# Patient Record
Sex: FEMALE | Race: BLACK OR AFRICAN AMERICAN
[De-identification: names, ages, dates, MRNs, and addresses within clinical notes are randomized per-mention and may not be internally consistent; named-entity substitution may affect disease eponyms.]

---

## 2018-06-12 ENCOUNTER — HOSPITAL ENCOUNTER (EMERGENCY)
Dept: HOSPITAL 62 - ER | Age: 48
LOS: 1 days | Discharge: HOME | End: 2018-06-13
Payer: SELF-PAY

## 2018-06-12 DIAGNOSIS — I10: ICD-10-CM

## 2018-06-12 DIAGNOSIS — R42: Primary | ICD-10-CM

## 2018-06-12 DIAGNOSIS — Z90.710: ICD-10-CM

## 2018-06-12 DIAGNOSIS — M79.605: ICD-10-CM

## 2018-06-12 DIAGNOSIS — Z90.49: ICD-10-CM

## 2018-06-12 DIAGNOSIS — Z88.6: ICD-10-CM

## 2018-06-12 DIAGNOSIS — F17.210: ICD-10-CM

## 2018-06-12 LAB
ADD MANUAL DIFF: NO
ALBUMIN SERPL-MCNC: 4.2 G/DL (ref 3.5–5)
ALP SERPL-CCNC: 82 U/L (ref 38–126)
ALT SERPL-CCNC: 39 U/L (ref 9–52)
ANION GAP SERPL CALC-SCNC: 9 MMOL/L (ref 5–19)
APPEARANCE UR: CLEAR
APTT PPP: YELLOW S
AST SERPL-CCNC: 43 U/L (ref 14–36)
BASOPHILS # BLD AUTO: 0 10^3/UL (ref 0–0.2)
BASOPHILS NFR BLD AUTO: 0.6 % (ref 0–2)
BILIRUB DIRECT SERPL-MCNC: 0.2 MG/DL (ref 0–0.4)
BILIRUB SERPL-MCNC: 0.3 MG/DL (ref 0.2–1.3)
BILIRUB UR QL STRIP: NEGATIVE
BUN SERPL-MCNC: 11 MG/DL (ref 7–20)
CALCIUM: 9.4 MG/DL (ref 8.4–10.2)
CHLORIDE SERPL-SCNC: 108 MMOL/L (ref 98–107)
CO2 SERPL-SCNC: 27 MMOL/L (ref 22–30)
EOSINOPHIL # BLD AUTO: 0.2 10^3/UL (ref 0–0.6)
EOSINOPHIL NFR BLD AUTO: 2.8 % (ref 0–6)
ERYTHROCYTE [DISTWIDTH] IN BLOOD BY AUTOMATED COUNT: 14.2 % (ref 11.5–14)
GLUCOSE SERPL-MCNC: 86 MG/DL (ref 75–110)
GLUCOSE UR STRIP-MCNC: NEGATIVE MG/DL
HCT VFR BLD CALC: 35.2 % (ref 36–47)
HGB BLD-MCNC: 11.8 G/DL (ref 12–15.5)
KETONES UR STRIP-MCNC: NEGATIVE MG/DL
LYMPHOCYTES # BLD AUTO: 4.2 10^3/UL (ref 0.5–4.7)
LYMPHOCYTES NFR BLD AUTO: 56.1 % (ref 13–45)
MCH RBC QN AUTO: 31.4 PG (ref 27–33.4)
MCHC RBC AUTO-ENTMCNC: 33.6 G/DL (ref 32–36)
MCV RBC AUTO: 93 FL (ref 80–97)
MONOCYTES # BLD AUTO: 0.5 10^3/UL (ref 0.1–1.4)
MONOCYTES NFR BLD AUTO: 6.7 % (ref 3–13)
NEUTROPHILS # BLD AUTO: 2.5 10^3/UL (ref 1.7–8.2)
NEUTS SEG NFR BLD AUTO: 33.8 % (ref 42–78)
NITRITE UR QL STRIP: NEGATIVE
PH UR STRIP: 7 [PH] (ref 5–9)
PLATELET # BLD: 186 10^3/UL (ref 150–450)
POTASSIUM SERPL-SCNC: 3.8 MMOL/L (ref 3.6–5)
PROT SERPL-MCNC: 7.6 G/DL (ref 6.3–8.2)
PROT UR STRIP-MCNC: NEGATIVE MG/DL
RBC # BLD AUTO: 3.78 10^6/UL (ref 3.72–5.28)
SODIUM SERPL-SCNC: 143.6 MMOL/L (ref 137–145)
SP GR UR STRIP: 1.01
TOTAL CELLS COUNTED % (AUTO): 100 %
UROBILINOGEN UR-MCNC: NEGATIVE MG/DL (ref ?–2)
WBC # BLD AUTO: 7.5 10^3/UL (ref 4–10.5)

## 2018-06-12 PROCEDURE — 81001 URINALYSIS AUTO W/SCOPE: CPT

## 2018-06-12 PROCEDURE — 99284 EMERGENCY DEPT VISIT MOD MDM: CPT

## 2018-06-12 PROCEDURE — 80053 COMPREHEN METABOLIC PANEL: CPT

## 2018-06-12 PROCEDURE — 93005 ELECTROCARDIOGRAM TRACING: CPT

## 2018-06-12 PROCEDURE — 93971 EXTREMITY STUDY: CPT

## 2018-06-12 PROCEDURE — 85025 COMPLETE CBC W/AUTO DIFF WBC: CPT

## 2018-06-12 PROCEDURE — 93010 ELECTROCARDIOGRAM REPORT: CPT

## 2018-06-12 PROCEDURE — 36415 COLL VENOUS BLD VENIPUNCTURE: CPT

## 2018-06-12 NOTE — ER DOCUMENT REPORT
ED Extremity Problem, Lower





- General


Chief Complaint: Leg Pain


Stated Complaint: LT LEG PAIN


Time Seen by Provider: 06/12/18 21:08


Notes: 





This is a 47-year-old female patient complaining of pain down the left leg as 

well as dizziness.  Patient states that she overdid herself graduation on 

Saturday.  Was running around and on her feet all day long.  Now having pain 

that shoots down her low back all the way down to her left lower extremity.  

Most of the pain is located behind the knee and in the calf.  Patient states 

that she had a family member that had a blood clot before but this was not a 

blood relative.  She is worried about blood clots and wanted to get checked 

out.  Denies any chest pain but does have some dizziness that comes and goes.  

Seems to be worse with head movement.  No fever, chills, sweats.  No other 

major problems at this time.


TRAVEL OUTSIDE OF THE [place] IN LAST 30 DAYS: No





- HPI


Patient complains to provider of: Pain


Location: Leg





- Related Data


Allergies/Adverse Reactions: 


 





acetaminophen [From Percocet] Adverse Reaction (Verified 06/12/18 19:37)


 


oxycodone [From Percocet] Adverse Reaction (Verified 06/12/18 19:37)


 











Past Medical History





- General


Information source: Patient





- Social History


Smoking Status: Current Every Day Smoker


Cigarette use (# per day): Yes


Smoking Education Provided: Yes


Frequency of alcohol use: None


Drug Abuse: None


Lives with: Spouse/Significant other


Family History: Hypertension





- Past Medical History


Cardiac Medical History: Reports: Hx Hypertension


Past Surgical History: Reports: Hx Cholecystectomy, Hx Hysterectomy, Hx Tubal 

Ligation





- Immunizations


Hx Diphtheria, Pertussis, Tetanus Vaccination: Yes





Review of Systems





- Review of Systems


Constitutional: No symptoms reported


EENT: See HPI, Vertigo.  denies: Eye pain, Eye discharge, Blurred vision, 

Tearing, Double vision, Ear pain


Cardiovascular: denies: Chest pain, Palpitations, Heart racing


Respiratory: No symptoms reported


Gastrointestinal: No symptoms reported


Genitourinary: No symptoms reported


Female Genitourinary: No symptoms reported


Musculoskeletal: See HPI, Muscle pain, Leg swelling


Skin: No symptoms reported


Hematologic/Lymphatic: No symptoms reported


Neurological/Psychological: No symptoms reported





Physical Exam





- Vital signs


Vitals: 


 











Temp Pulse Resp BP Pulse Ox


 


 98.7 F   78   20   151/89 H  99 


 


 06/12/18 19:53  06/12/18 19:53  06/12/18 19:53  06/12/18 19:53  06/12/18 19:53











Interpretation: Normal





- General


General appearance: Appears well, Alert





- HEENT


Head: Normocephalic, Atraumatic


Eyes: Normal


Pupils: PERRL





- Respiratory


Respiratory status: No respiratory distress


Chest status: Nontender


Breath sounds: Normal


Chest palpation: Normal





- Cardiovascular


Rhythm: Regular


Heart sounds: Normal auscultation


Murmur: No





- Abdominal


Inspection: Normal


Distension: No distension


Bowel sounds: Normal


Tenderness: Nontender


Organomegaly: No organomegaly





- Back


Back: Normal, Nontender





- Extremities


General upper extremity: Normal inspection, Nontender, Normal color, Normal ROM

, Normal temperature


General lower extremity: Normal inspection, Nontender, Normal color, Normal ROM

, Normal temperature, Normal weight bearing.  No: Kriss's sign





- Neurological


Neuro grossly intact: Yes


Cognition: Normal


Orientation: AAOx4


Sabrina Coma Scale Eye Opening: Spontaneous


Sabrina Coma Scale Verbal: Oriented


Sabrina Coma Scale Motor: Obeys Commands


Sabrina Coma Scale Total: 15


Speech: Normal


Motor strength normal: LUE, RUE, LLE, RLE


Additional motor exam normals: Equal 


Babinski reflex: Normal (flexor plantar)


Sensory: Normal


Notes: 





Vision has reproducible dizziness with Glenwood-Hallpike maneuver with head turned 

to the left.  No obvious nystagmus.





- Psychological


Associated symptoms: Normal affect, Normal mood





- Skin


Skin Temperature: Warm


Skin Moisture: Dry


Skin Color: Normal





Course





- Re-evaluation


Re-evalutation: 





06/12/18 21:26


Unlikely this represents a blood clot as it seems to be coming from her low 

back area and more consistent with a muscle strain.  Is tender to palpation in 

the hamstrings and in the calf muscle.  Will do ultrasound to rule out DVT, 

will get some basic labs based on her dizziness.  EKG, CBC chemistry ordered.


06/12/18 21:58





06/12/18 23:04





Laboratory











  06/12/18 06/12/18 06/12/18





  21:58 21:58 22:20


 


WBC   7.5 


 


RBC   3.78 


 


Hgb   11.8 L 


 


Hct   35.2 L 


 


MCV   93 


 


MCH   31.4 


 


MCHC   33.6 


 


RDW   14.2 H 


 


Plt Count   186 


 


Seg Neutrophils %   33.8 L 


 


Lymphocytes %   56.1 H 


 


Monocytes %   6.7 


 


Eosinophils %   2.8 


 


Basophils %   0.6 


 


Absolute Neutrophils   2.5 


 


Absolute Lymphocytes   4.2 


 


Absolute Monocytes   0.5 


 


Absolute Eosinophils   0.2 


 


Absolute Basophils   0.0 


 


Sodium  143.6  


 


Potassium  3.8  


 


Chloride  108 H  


 


Carbon Dioxide  27  


 


Anion Gap  9  


 


BUN  11  


 


Creatinine  0.82  


 


Est GFR ( Amer)  > 60  


 


Est GFR (Non-Af Amer)  > 60  


 


Glucose  86  


 


Calcium  9.4  


 


Total Bilirubin  0.3  


 


Direct Bilirubin  0.2  


 


Neonat Total Bilirubin  Not Reportable  


 


Neonat Direct Bilirubin  Not Reportable  


 


Neonat Indirect Bili  Not Reportable  


 


AST  43 H  


 


ALT  39  


 


Alkaline Phosphatase  82  


 


Total Protein  7.6  


 


Albumin  4.2  


 


Urine Color    YELLOW


 


Urine Appearance    CLEAR


 


Urine pH    7.0


 


Ur Specific Gravity    1.012


 


Urine Protein    NEGATIVE


 


Urine Glucose (UA)    NEGATIVE


 


Urine Ketones    NEGATIVE


 


Urine Blood    MODERATE H


 


Urine Nitrite    NEGATIVE


 


Urine Bilirubin    NEGATIVE


 


Urine Urobilinogen    NEGATIVE


 


Ur Leukocyte Esterase    NEGATIVE


 


Urine WBC (Auto)    1


 


Urine RBC (Auto)    1


 


Squamous Epi Cells Auto    5


 


Urine Mucus (Auto)    RARE


 


Urine Ascorbic Acid    NEGATIVE








At this time I believe that the labs are fairly unremarkable.  EKG 

unremarkable.  Feels better.  Ultrasound negative.  Will recommend close 

outpatient follow-up.  Return for any worsening symptoms or concerns.  Patient 

states that her pain gets better on ibuprofen so we will write her prescription 

for some prescription strength ibuprofen.





- Vital Signs


Vital signs: 


 











Temp Pulse Resp BP Pulse Ox


 


 98.7 F   78   20   151/89 H  99 


 


 06/12/18 19:53  06/12/18 19:53  06/12/18 19:53  06/12/18 19:53  06/12/18 19:53














- Laboratory


Result Diagrams: 


 06/12/18 21:58





 06/12/18 21:58


Laboratory results interpreted by me: 


 











  06/12/18 06/12/18 06/12/18





  21:58 21:58 22:20


 


Hgb   11.8 L 


 


Hct   35.2 L 


 


RDW   14.2 H 


 


Seg Neutrophils %   33.8 L 


 


Lymphocytes %   56.1 H 


 


Chloride  108 H  


 


AST  43 H  


 


Urine Blood    MODERATE H














- EKG Interpretation by Me


EKG shows normal: Sinus rhythm, Axis, Intervals, QRS Complexes, ST-T Waves





Discharge





- Discharge


Clinical Impression: 


 Vertigo, Leg pain, left





Condition: Good


Disposition: HOME, SELF-CARE


Instructions:  Leg Pain Nonspecific ([facility]), Vertigo ([facility])


Additional Instructions: 


Continue with ibuprofen 3 times a day.  Meclizine as needed for the dizziness.  

Follow-up with your regular doctor.  If symptoms are getting worse or you begin 

to have any worsening symptoms or concerns please return for repeat evaluation 

if unable to be seen by your primary care doctor.


Prescriptions: 


Ibuprofen [Motrin 800 mg Tablet] 800 mg PO Q8H PRN 10 Days #30 tab


 PRN Reason: For Pain Scale 3-4


Meclizine HCl 25 mg PO BID PRN 10 Days #20 tablet


 PRN Reason: Dizziness


Forms:  Return to Work


Referrals: 


LETICIA KHAN MD [[facility]] - Follow up as needed

## 2018-06-13 VITALS — SYSTOLIC BLOOD PRESSURE: 140 MMHG | DIASTOLIC BLOOD PRESSURE: 84 MMHG

## 2018-06-13 NOTE — EKG REPORT
SEVERITY:- BORDERLINE ECG -

SINUS RHYTHM

BORDERLINE LEFT AXIS DEVIATION

BORDERLINE T ABNORMALITIES, INFERIOR LEADS

:

Confirmed by: John Nieves MD 13-Jun-2018 07:16:05

## 2018-06-13 NOTE — XCELERA REPORT
[address] [address]

                             Tel: [phone]

                             Fax: [phone]



                     Lower Extremity Venous Evaluation

____________________________________________________________________________



Name: SOULEYMANE JORDAN

MRN: [id number]                Age: 47 yrs

Gender: Female                 : 1970

Patient Status: Preadmit       Patient Location: ER

Account #: [id number]

Study Date: 2018 09:26 PM

Accession #: [id number]

____________________________________________________________________________



Procedure: Color flow and duplex imaging of the veins of the left lower

extremity as well as the right Common Femoral vein.

Reason For Study: Left leg pain, possible DVT





Ordering Physician: NORM MIDDLETON

Performed By: Carmen eJnkins

____________________________________________________________________________



____________________________________________________________________________





Right Sided Venous Evaluation

The right common femoral vein is fully compressible. Spontaneous and phasic

flow is present in the right common femoral vein.



Left Sided Venous Evaluation

Posterior Tibial vein hard to see.



Normal vessel filling wall to wall, compression and augmentation as well as

Colour flow down to the infrageniculate veins.

____________________________________________________________________________





Interpretation Summary

No duplex evidence of DVT or obstruction in the left lower extremity nor in

the right Common Femoral vein.

____________________________________________________________________________



____________________________________________________________________________



Electronically signed by:      Lennox Williams      on 2018 08:04 AM



CC: NORM MIDDLETON

>

Williams, Lennox

## 2019-02-13 NOTE — ER DOCUMENT REPORT
ED General





- General


Chief Complaint: Chest Pain


Stated Complaint: CHEST PAIN


Time Seen by Provider: 02/13/19 12:35


Primary Care Provider: 


SARA JORGENSEN MD [ACTIVE STAFF] - Follow up in 3-5 days (cardiology, call 

for stress test set up.)


SHAN ANDERSEN DO [Primary Care Provider] - Follow up as needed


Notes: 





Patient is a 48-year-old female that presents to the emergency department for 

chief complaint of chest pain, and tingling in her face.  Patient states the 

symptoms started earlier today, they have been coming and going, which she feels

a tightness in her chest, and tingling along her right face, she states that it 

comes and goes.  She is had some pain in her neck on the right side as well, 

with mild shortness of breath associated with this.  It does not appear to be 

worse with exertion or better with rest.  She denies noting any recent cough, 

fevers, chills, night sweats, nausea, vomiting or diaphoresis.  Reports family 

history of coronary disease in her mother, denies personal history of diabetes, 

hyperlipidemia or heart disease.  She does admit to having hypertension, was 

just recently started on a low-dose hydrochlorothiazide pill.  Former smoker.





Past Medical History: Hypertension, GERD


Past Surgical History: Cholecystectomy, partial hysterectomy


Social History: Former smoker, admits occasional alcohol use, denies illicit 

drug use.


Family History: Reviewed and noncontributory for presenting illness


Allergies: Reviewed, see documented allergy list. 





REVIEW OF SYSTEMS:


Other than noted above, the 12 point review of systems was reviewed with the 

patient and were negative, all pertinent findings are included in the HPI.





PHYSICAL EXAMINATION:





Vital signs reviewed, nursing noted reviewed. 





GENERAL: Obese female, in no acute distress.





HEAD: Atraumatic, normocephalic.





EYES: Eyes appear normal, extraocular movements intact, sclera anicteric, 

conjunctiva are normal.





ENT: nares patent, oropharynx clear without exudates.  Moist mucous membranes.





NECK: Normal range of motion, supple without lymphadenopathy, no neck tenderness

to palpation, no midline tenderness or deformity.





LUNGS: Breath sounds clear to auscultation bilaterally and equal.  No wheezes 

rales or rhonchi.





HEART: Regular rate and rhythm without murmurs





ABDOMEN: Soft, nontender, normoactive bowel sounds.  No rebound, guarding, or 

rigidity. No masses appreciated.





EXTREMITIES: Nontender, good range of motion, no pitting or edema.  





NEUROLOGICAL: No focal neurological deficits. Moves all extremities s

pontaneously Motor and sensory grossly intact on exam.





PSYCH: Normal mood, normal affect.





SKIN: Warm, Dry, normal turgor, no rashes or lesions noted on exposed skin





TRAVEL OUTSIDE OF THE [place] IN LAST 30 DAYS: No





- Related Data


Allergies/Adverse Reactions: 


                                        





acetaminophen [From Percocet] Adverse Reaction (Verified 02/13/19 12:19)


   


oxycodone [From Percocet] Adverse Reaction (Verified 02/13/19 12:19)


   











Past Medical History





- Social History


Smoking Status: Current Some Day Smoker


Chew tobacco use (# tins/day): No


Frequency of alcohol use: Occasional


Drug Abuse: None


Family History: Hypertension


Patient has suicidal ideation: No


Patient has homicidal ideation: No





- Past Medical History


Cardiac Medical History: Reports: Hx Hypertension


Renal/ Medical History: Denies: Hx Peritoneal Dialysis


Past Surgical History: Reports: Hx Cholecystectomy, Hx Hysterectomy, Hx Tubal 

Ligation





- Immunizations


Hx Diphtheria, Pertussis, Tetanus Vaccination: Yes





Physical Exam





- Vital signs


Vitals: 


                                        











Temp Pulse Resp BP Pulse Ox


 


 98.6 F   89   16   178/125 H  98 


 


 02/13/19 12:21  02/13/19 12:21  02/13/19 12:21  02/13/19 12:21  02/13/19 12:21














Course





- Re-evaluation


Re-evalutation: 





Patient seen and examined vital signs reviewed. 





Laboratory data and imaging were ordered as appropriate for the patient's 

presenting symptoms and complaint, with consideration of any critical or life 

threatening conditions that may be associated with their obtained history and 

exam as noted above.





Patient was treated with aspirin and labetalol 10 mg IV ordered by triage 

provider.





Results were reviewed when available and demonstrated negative and unremarkable 

blood work, troponin negative x2





The patient was re-evaluated and was stable





Evaluation was most consistent with chest pain, facial paresthesias





Results were discussed with the patient at this point, after careful 

consideration I feel that that patient can be discharged from the emergency 

department, the patient was educated treatments and reasons to return to the 

emergency department based on their presumed diagnosis as noted above, they were

advised to followup with a primary care physician in 2-3 days. Patient was 

agreeable to plan of care.





Presentation of chest pain in an otherwise well appearing patient. Low clinical 

suspicion for ACS given clinical history, exam, EKG without ST elevations or 

depressions, and negative initial troponin. HEART score less than or equal to 3.

PE also seems unlikely given clinical history, absence of tachycardia or 

dyspnea. Patient is PERC criteria negative. CXR without evidence of pneumothorax

or pneumonia. No widened mediastinum. Aortic dissection also seems unlikely 

given history, symmetric pulses, CXR, and vitals. 





HEART Score:





History 0      


ECG 0      


Age 1      


Risk Factors 2   


Troponin 0   





Total: 3





Chest pain in a patient without evidence of cardiac or other serious etiology on

workup today. I discussed with patient that, based on their age, risk factors 

and emergency department testing today, the likelihood that their symptoms are 

related to a heart attack is very low (estimated risk of heart attack or death 

over the next 30 days of less than 1%).  The patient demonstrates decision 

making capacity and has verbalized an understanding of these risks to me. Based 

on this,  the patient has chosen to follow-up as an outpatient. Usual chest pain

return precautions reviewed. The patient states understanding and agreement with

this plan.








*Note is created using voice recognition software and may contain spelling, 

syntax or grammatical errors.











Laboratory











  02/13/19 02/13/19 02/13/19





  13:20 13:20 13:20


 


WBC  9.1  


 


RBC  4.26  


 


Hgb  13.6  


 


Hct  39.3  


 


MCV  92  


 


MCH  31.8  


 


MCHC  34.5  


 


RDW  14.0  


 


Plt Count  202  


 


Seg Neutrophils %  73.2  


 


Lymphocytes %  21.8  


 


Monocytes %  4.5  


 


Eosinophils %  0.0  


 


Basophils %  0.5  


 


Absolute Neutrophils  6.7  


 


Absolute Lymphocytes  2.0  


 


Absolute Monocytes  0.4  


 


Absolute Eosinophils  0.0  


 


Absolute Basophils  0.0  


 


Sodium   139.5 


 


Potassium   4.5 


 


Chloride   99 


 


Carbon Dioxide   27 


 


Anion Gap   14 


 


BUN   15 


 


Creatinine   0.91 


 


Est GFR ( Amer)   > 60 


 


Est GFR (Non-Af Amer)   > 60 


 


Glucose   161 H 


 


Calcium   9.8 


 


Total Bilirubin   0.5 


 


Direct Bilirubin   0.3 


 


Neonat Total Bilirubin   Not Reportable 


 


Neonat Direct Bilirubin   Not Reportable 


 


Neonat Indirect Bili   Not Reportable 


 


AST   50 H 


 


ALT   86 H 


 


Alkaline Phosphatase   111 


 


Creatine Kinase   28 L 


 


CK-MB (CK-2)    0.27


 


Troponin I    < 0.012


 


Total Protein   8.2 


 


Albumin   4.8 














  02/13/19





  16:26


 


WBC 


 


RBC 


 


Hgb 


 


Hct 


 


MCV 


 


MCH 


 


MCHC 


 


RDW 


 


Plt Count 


 


Seg Neutrophils % 


 


Lymphocytes % 


 


Monocytes % 


 


Eosinophils % 


 


Basophils % 


 


Absolute Neutrophils 


 


Absolute Lymphocytes 


 


Absolute Monocytes 


 


Absolute Eosinophils 


 


Absolute Basophils 


 


Sodium 


 


Potassium 


 


Chloride 


 


Carbon Dioxide 


 


Anion Gap 


 


BUN 


 


Creatinine 


 


Est GFR ( Amer) 


 


Est GFR (Non-Af Amer) 


 


Glucose 


 


Calcium 


 


Total Bilirubin 


 


Direct Bilirubin 


 


Neonat Total Bilirubin 


 


Neonat Direct Bilirubin 


 


Neonat Indirect Bili 


 


AST 


 


ALT 


 


Alkaline Phosphatase 


 


Creatine Kinase 


 


CK-MB (CK-2) 


 


Troponin I  < 0.012


 


Total Protein 


 


Albumin 











                                        





Chest X-Ray  02/13/19 12:39


IMPRESSION:  NO ACUTE RADIOGRAPHIC FINDING IN THE CHEST.


 








Head CT  02/13/19 12:39


IMPRESSION:  No evidence of acute intracranial process.


Enlarged partially empty sella turcica, stable.


EVIDENCE OF ACUTE STROKE: NO.


 














- Vital Signs


Vital signs: 


                                        











Temp Pulse Resp BP Pulse Ox


 


 98.6 F   89   20   125/91 H  98 


 


 02/13/19 12:21  02/13/19 12:21  02/13/19 18:01  02/13/19 18:01  02/13/19 18:01

















- Laboratory


Result Diagrams: 


                                 02/13/19 13:20





                                 02/13/19 13:20


Laboratory results interpreted by me: 


                                        











  02/13/19





  13:20


 


Glucose  161 H


 


AST  50 H


 


ALT  86 H


 


Creatine Kinase  28 L














- EKG Interpretation by Me


Additional EKG results interpreted by me: 





EKG demonstrates sinus rhythm with a ventricular rate of 86 bpm, left axis 

deviation, normal intervals, no evidence of acute ischemia on this EKG, no ST 

changes.  This is compared with the prior EKG from 6/12/2018, without 

significant change.








Discharge





- Discharge


Clinical Impression: 


 Facial paresthesia





Chest pain


Qualifiers:


 Chest pain type: unspecified Qualified Code(s): R07.9 - Chest pain, unspecified





Condition: Stable


Disposition: HOME, SELF-CARE


Instructions:  Chest Pain of Unclear Cause ([facility])


Additional Instructions: 


Please follow-up with your primary care physician regarding her blood pressure, 

and follow-up with cardiology to set up a stress test.  If you have worsening of

your symptoms or further concerns, you can always return to the emergency 

department to be reevaluated.


Referrals: 


SHAN ANDERSEN DO [Primary Care Provider] - Follow up as needed


SARA JORGENSEN MD [ACTIVE STAFF] - Follow up in 3-5 days (cardiology, call 

for stress test set up.)

## 2019-02-13 NOTE — RADIOLOGY REPORT (SQ)
EXAM DESCRIPTION:  CHEST SINGLE VIEW



COMPLETED DATE/TIME:  2/13/2019 2:17 pm



REASON FOR STUDY:  Chest pain



COMPARISON:  5/28/2013



EXAM PARAMETERS:  NUMBER OF VIEWS: One view.

TECHNIQUE: Single frontal radiographic view of the chest acquired.

RADIATION DOSE: NA

LIMITATIONS: None.



FINDINGS:  LUNGS AND PLEURA: No opacities, masses or pneumothorax. No pleural effusion.

MEDIASTINUM AND HILAR STRUCTURES: No masses.  Contour normal.

HEART AND VASCULAR STRUCTURES: Heart normal in size.  Normal vasculature.

BONES: No acute findings.

HARDWARE: None in the chest.

OTHER: No other significant finding.



IMPRESSION:  NO ACUTE RADIOGRAPHIC FINDING IN THE CHEST.



TECHNICAL DOCUMENTATION:  JOB ID:  [id number]

 2011 [employer]- All Rights Reserved



Reading location - IP/workstation name: [email or web address]

## 2019-02-13 NOTE — RADIOLOGY REPORT (SQ)
EXAM DESCRIPTION:  CT HEAD WITHOUT



COMPLETED DATE/TIME:  2/13/2019 2:12 pm



REASON FOR STUDY:  Headache and dizziness



COMPARISON:  11/10/10.



TECHNIQUE:  Axial images acquired through the brain without intravenous contrast.  Images reviewed wi
th bone, brain and subdural windows.  Additional sagittal and coronal reconstructions were generated.
 Images stored on PACS.

All CT scanners at this facility use dose modulation, iterative reconstruction, and/or weight based d
osing when appropriate to reduce radiation dose to as low as reasonably achievable (ALARA).

[facility]: Dose Right  [facility]: CareDose    [facility]: Dose Right    [facility]: Teradose 4D    [facility]: Smart [employer]



RADIATION DOSE:  CT Rad equipment meets quality standard of care and radiation dose reduction techniq
ues were employed. CTDIvol: 53.2 mGy. DLP: 1070 mGy-cm. mGy.



LIMITATIONS:  None.



FINDINGS:  VENTRICLES: Normal size and contour.

CEREBRUM: No masses.  No hemorrhage.  No midline shift.  No evidence for acute infarction. Normal gra
y/white matter differentiation. No areas of low density in the white matter.

CEREBELLUM: No masses.  No hemorrhage.  No alteration of density.  No evidence for acute infarction.

EXTRAAXIAL SPACES: No fluid collections.  No masses.

ORBITS AND GLOBE: No intra- or extraconal masses.  Normal contour of globe without masses.

CALVARIUM: No fracture.

PARANASAL SINUSES: Mild polypoid mucosal thickening within the right maxillary sinus.  Remaining para
nasal sinuses and mastoid air cells are clear.

SOFT TISSUES: No mass or hematoma.

OTHER: Enlarged partially empty sella turcica, stable.



IMPRESSION:  No evidence of acute intracranial process.

Enlarged partially empty sella turcica, stable.

EVIDENCE OF ACUTE STROKE: NO.



COMMENT:  Quality ID # 436: Final reports with documentation of one or more dose reduction techniques
 (e.g., Automated exposure control, adjustment of the mA and/or kV according to patient size, use of 
iterative reconstruction technique)



TECHNICAL DOCUMENTATION:  JOB ID:  [id number]

 2011 [employer]- All Rights Reserved



Reading location - IP/workstation name: [email or web address]

## 2019-02-13 NOTE — ER DOCUMENT REPORT
ED Medical Screen (RME)





- General


Chief Complaint: Chest Pain


Stated Complaint: CHEST PAIN


Time Seen by Provider: 02/13/19 12:35


Notes: 





48 years old female presents today with a 2-day history of elevated blood 

pressure chest pain palpitation, headache numbness tingling sensation over the 

right arm.  No weakness.  Also had neck pain but no neck stiffness.





On examination-appears anxious.  No nuchal rigidity, no focal weaknesses noted. 

Normal heart sounds








TRAVEL OUTSIDE OF THE [place] IN LAST 30 DAYS: No





- Related Data


Allergies/Adverse Reactions: 


                                        





acetaminophen [From Percocet] Adverse Reaction (Verified 02/13/19 12:19)


   


oxycodone [From Percocet] Adverse Reaction (Verified 02/13/19 12:19)


   











Past Medical History





- Past Medical History


Cardiac Medical History: Reports: Hx Hypertension


Renal/ Medical History: Denies: Hx Peritoneal Dialysis


Past Surgical History: Reports: Hx Cholecystectomy, Hx Hysterectomy, Hx Tubal 

Ligation





- Immunizations


Hx Diphtheria, Pertussis, Tetanus Vaccination: Yes





Physical Exam





- Vital signs


Vitals: 





                                        











Temp Pulse Resp BP Pulse Ox


 


 98.6 F   89   16   178/125 H  98 


 


 02/13/19 12:21  02/13/19 12:21  02/13/19 12:21  02/13/19 12:21  02/13/19 12:21














Course





- Vital Signs


Vital signs: 





                                        











Temp Pulse Resp BP Pulse Ox


 


 98.6 F   89   16   178/125 H  98 


 


 02/13/19 12:21  02/13/19 12:21  02/13/19 12:21  02/13/19 12:21  02/13/19 12:21

## 2019-02-14 NOTE — EKG REPORT
SEVERITY:- ABNORMAL ECG -

SINUS RHYTHM

LVH BY VOLTAGE

NONSPECIFIC T ABNORMALITIES, INFERIOR LEADS

:

Confirmed by: Mellisa Altamirano MD 14-Feb-2019 10:45:02

## 2019-06-03 NOTE — EKG REPORT
SEVERITY:- BORDERLINE ECG -

SINUS RHYTHM

LVH BY VOLTAGE

NONSPECIFIC ST-T CHANGES- INFERIOR LEADS, MILD, UNCHANGED FROM 2/13/19 [facility].

:

Confirmed by: John Nieves MD 03-Jun-2019 13:23:11

## 2019-06-03 NOTE — RADIOLOGY REPORT (SQ)
EXAM DESCRIPTION:  CHEST 2 VIEWS



COMPLETED DATE/TIME:  6/3/2019 10:43 am



REASON FOR STUDY:  cp



COMPARISON:  None.



TECHNIQUE:  Frontal and lateral radiographic views of the chest acquired.



NUMBER OF VIEWS:  Two view.



LIMITATIONS:  None.



FINDINGS:  LUNGS AND PLEURA: No opacities, masses or pneumothorax. No pleural effusion.

MEDIASTINUM AND HILAR STRUCTURES: No masses or contour abnormalities.

HEART AND VASCULAR STRUCTURES: Heart normal size.  No evidence for failure.

BONES: No acute findings.

HARDWARE: None in the chest.

OTHER: No other significant finding.



IMPRESSION:  NO SIGNIFICANT RADIOGRAPHIC FINDING IN THE CHEST.



TECHNICAL DOCUMENTATION:  JOB ID:  [id number]

 2011 [employer]- All Rights Reserved



Reading location - IP/workstation name: [email or web address]

## 2019-06-03 NOTE — ER DOCUMENT REPORT
ED Medical Screen (RME)





- General


Chief Complaint: High Blood Pressure


Stated Complaint: ARM PAIN


Time Seen by Provider: 06/03/19 09:56


Primary Care Provider: 


SHAN ANDERSEN DO [Primary Care Provider] - Follow up as needed


Mode of Arrival: Ambulatory


Information source: Patient


Notes: 





Patient presents to the emergency department with complaints of high blood 

pressure chest pain left arm pain.  Patient reports on Friday she noted 

bilateral neck pain.  The next day her left arm began hurting.  She denies 

trauma.  She reports yesterday and today she started having some nausea hot 

flashes and what she believes is indigestion and still having left arm pain.  

Denies history of cardiac disease.  Is unsure of family history of cardiac 

disease.  Does have history of high blood pressure.  She took her 

hydrochlorothiazide today.  She also took 4 baby aspirin.  Denies fever vomiting

or diarrhea.








I have greeted and performed a rapid initial assessment of this patient.  A 

comprehensive ED assessment and evaluation of the patient, analysis of test 

results and completion of the medical decision making process will be conducted 

by additional ED providers.


Dictation of this chart was performed using voice recognition software; 

therefore, there may be some unintended grammatical errors.


TRAVEL OUTSIDE OF THE [place] IN LAST 30 DAYS: No





- Related Data


Allergies/Adverse Reactions: 


                                        





acetaminophen [From Percocet] Adverse Reaction (Verified 06/03/19 09:10)


   


oxycodone [From Percocet] Adverse Reaction (Verified 06/03/19 09:10)


   











Past Medical History





- Social History


Chew tobacco use (# tins/day): No


Frequency of alcohol use: Occasional


Drug Abuse: None





- Past Medical History


Cardiac Medical History: Reports: Hx Hypertension


Renal/ Medical History: Denies: Hx Peritoneal Dialysis


Past Surgical History: Reports: Hx Cholecystectomy, Hx Hysterectomy, Hx Tubal 

Ligation





- Immunizations


Hx Diphtheria, Pertussis, Tetanus Vaccination: Yes





Physical Exam





- Vital signs


Vitals: 





                                        











Temp Pulse Resp BP Pulse Ox


 


 98.2 F   96   18   149/100 H  97 


 


 06/03/19 09:12  06/03/19 09:12  06/03/19 09:12  06/03/19 09:12  06/03/19 09:12














Course





- Vital Signs


Vital signs: 





                                        











Temp Pulse Resp BP Pulse Ox


 


 98.2 F   96   18   149/100 H  97 


 


 06/03/19 09:12  06/03/19 09:12  06/03/19 09:12  06/03/19 09:12  06/03/19 09:12














Doctor's Discharge





- Discharge


Referrals: 


SHAN ANDERSEN DO [Primary Care Provider] - Follow up as needed

## 2019-06-03 NOTE — ER DOCUMENT REPORT
ED General





- General


Chief Complaint: High Blood Pressure


Stated Complaint: ARM PAIN


Time Seen by Provider: 06/03/19 09:56


Primary Care Provider: 


SHAN ANDERSEN DO [Primary Care Provider] - Follow up as needed


Mode of Arrival: Ambulatory


Information source: Patient, [facility] Records


Notes: 





48-year-old female with hypertension on hydrochlorothiazide presents with 

multiple complaints including neck pain, left bicep pain and midsternal chest 

pain.  Patient states neck pain started 4 days prior to arrival.  She describes 

it as bilateral, aching.  Left arm pain started 3 days prior to arrival.  It is 

located in her left bicep described as an aching throbbing pain that is worse 

with extension.  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

noted pain.  She did try to take ibuprofen without relief.  Patient's chest pain

started this morning and she describes it as a burning pain that is currently 

gone.  Patient does smoke 3 cigarettes daily, occasionally drinks.  She denies 

any previous history of MI, CVA.


TRAVEL OUTSIDE OF THE [place] IN LAST 30 DAYS: No





- HPI


Onset: Other


Onset/Duration: Gradual, Intermittent, Better


Quality of pain: Achy, Throbbing


Severity: Mild


Associated symptoms: Body/muscle aches, Chest pain, Nausea.  denies: Headache, 

Leg swelling, Vomiting, Shortness of breath, Sweating, Weakness


Exacerbated by: Movement


Relieved by: Denies


Similar symptoms previously: No


Recently seen / treated by doctor: No





- Related Data


Allergies/Adverse Reactions: 


                                        





acetaminophen [From Percocet] Adverse Reaction (Verified 06/03/19 09:10)


   


oxycodone [From Percocet] Adverse Reaction (Verified 06/03/19 09:10)


   











Past Medical History





- General


Information source: Patient





- Social History


Smoking Status: Current Every Day Smoker


Cigarette use (# per day): Yes - 3


Chew tobacco use (# tins/day): No


Smoking Education Provided: Yes - Smoking cessation counseling was provided for 

4 minutes at the bedside 


Frequency of alcohol use: Occasional


Drug Abuse: None


Lives with: Spouse/Significant other


Family History: Hypertension


Patient has suicidal ideation: No


Patient has homicidal ideation: No





- Past Medical History


Cardiac Medical History: Reports: Hx Hypertension


Renal/ Medical History: Denies: Hx Peritoneal Dialysis


Past Surgical History: Reports: Hx Cholecystectomy, Hx Hysterectomy, Hx Tubal 

Ligation





- Immunizations


Hx Diphtheria, Pertussis, Tetanus Vaccination: Yes





Review of Systems





- Review of Systems


Notes: 





REVIEW OF SYSTEMS:


CONSTITUTIONAL :  Denies fever,  chills, or sweats.  Denies recent illness. 

Denies weight loss, recent hospitalizations. 


EENT: Denies visual changes, eye pain.  Denies sore throat, oral lesions, 

difficulty swallowing.


CARDIOVASCULAR:    Denies palpitations. Denies lower extremity edema.


RESPIRATORY:  Denies cough. Denies shortness of breath, wheezing.


GASTROINTESTINAL:  Denies abdominal pain or distention.  Denies nausea, 

vomiting, or diarrhea.  Denies blood in vomitus, stools, or per rectum.  Denies 

black, tarry stools.  Denies constipation.  


GENITOURINARY:  Denies difficulty urinating, painful urination,  frequency, 

blood in urine, or  vaginal discharge.


MUSCULOSKELETAL:  Denies back  pain or stiffness.  Denies joint pain or 

swelling.


SKIN:   Denies rash, lesions or sores.


HEMATOLOGIC :   Denies easy bruising or bleeding.


LYMPHATIC:  Denies swollen glands.


NEUROLOGICAL:  Denies confusion or altered mental status.  Denies loss of 

consciousness.  Denies dizziness or lightheadedness.  Denies headache.  Denies 

weakness or paralysis.  Denies problems difficulty with ambulation, slurred 

speech.  Denies sensory loss, numbness, or tingling.  Denies seizures.


PSYCHIATRIC:  Denies anxiety or stress.  Denies depression, suicidal ideation, 

or homicidal ideation.  Denies visual or auditory hallucinations.








Physical Exam





- Vital signs


Vitals: 


                                        











Temp Pulse Resp BP Pulse Ox


 


 98.2 F   96   18   149/100 H  97 


 


 06/03/19 09:12  06/03/19 09:12  06/03/19 09:12  06/03/19 09:12  06/03/19 09:12














- Notes


Notes: 





PHYSICAL EXAMINATION:





GENERAL: Well-appearing, well-nourished and in no acute distress.





HEAD: Atraumatic, normocephalic.





EYES: Pupils equal round and reactive to light, extraocular movements intact, 

conjunctiva are normal.





ENT: Nares patent, oropharynx clear without exudates.  Moist mucous membranes.





NECK: Normal range of motion, supple without lymphadenopathy.  Increased muscle 

tonicity along the trapezius bilaterally.





LUNGS: Breath sounds clear to auscultation bilaterally and equal.  No wheezes 

rales or rhonchi.





HEART: Regular rate and rhythm without murmurs





ABDOMEN: Soft, nontender, nondistended abdomen.  No guarding, no rebound.  No 

masses appreciated.





Female : deferred





Musculoskeletal: Normal range of motion, no pitting or edema.  No cyanosis.  

Left upper extremity-pain with palpation to the left bicep, pain with extension.

 No bruising, ecchymosis, obvious deformity.





NEUROLOGICAL: Cranial nerves grossly intact.  Normal speech, normal gait.  

Normal sensory, motor exams





PSYCH: Normal mood, normal affect.





SKIN: Warm, Dry, normal turgor, no rashes or lesions noted.





Course





- Re-evaluation


Re-evalutation: 





06/03/19 13:21





Laboratory











  06/03/19 06/03/19 06/03/19





  10:22 10:22 10:22


 


WBC  3.9 L  


 


RBC  4.09  


 


Hgb  12.7  


 


Hct  37.8  


 


MCV  93  


 


MCH  31.1  


 


MCHC  33.6  


 


RDW  13.9  


 


Plt Count  148 L  


 


Seg Neutrophils %  34.3 L  


 


Lymphocytes %  52.2 H  


 


Monocytes %  8.6  


 


Eosinophils %  3.9  


 


Basophils %  1.0  


 


Absolute Neutrophils  1.3 L  


 


Absolute Lymphocytes  2.0  


 


Absolute Monocytes  0.3  


 


Absolute Eosinophils  0.2  


 


Absolute Basophils  0.0  


 


Sodium   142.0 


 


Potassium   3.6 


 


Chloride   102 


 


Carbon Dioxide   31 H 


 


Anion Gap   9 


 


BUN   14 


 


Creatinine   0.92 


 


Est GFR ( Amer)   > 60 


 


Est GFR (Non-Af Amer)   > 60 


 


Glucose   104 


 


Calcium   9.8 


 


Total Bilirubin   0.8 


 


Direct Bilirubin   0.4 


 


Neonat Total Bilirubin   Not Reportable 


 


Neonat Direct Bilirubin   Not Reportable 


 


Neonat Indirect Bili   Not Reportable 


 


AST   36 


 


ALT   36 


 


Alkaline Phosphatase   70 


 


Creatine Kinase   79 


 


Troponin I    < 0.012


 


Total Protein   7.9 


 


Albumin   4.5 


 


Lipase   45.3 


 


Urine Color   


 


Urine Appearance   


 


Urine pH   


 


Ur Specific Gravity   


 


Urine Protein   


 


Urine Glucose (UA)   


 


Urine Ketones   


 


Urine Blood   


 


Urine Nitrite   


 


Urine Bilirubin   


 


Urine Urobilinogen   


 


Ur Leukocyte Esterase   


 


Urine WBC (Auto)   


 


Urine RBC (Auto)   


 


U Hyaline Cast (Auto)   


 


Squamous Epi Cells Auto   


 


Urine Mucus (Auto)   


 


Urine Ascorbic Acid   














  06/03/19





  10:22


 


WBC 


 


RBC 


 


Hgb 


 


Hct 


 


MCV 


 


MCH 


 


MCHC 


 


RDW 


 


Plt Count 


 


Seg Neutrophils % 


 


Lymphocytes % 


 


Monocytes % 


 


Eosinophils % 


 


Basophils % 


 


Absolute Neutrophils 


 


Absolute Lymphocytes 


 


Absolute Monocytes 


 


Absolute Eosinophils 


 


Absolute Basophils 


 


Sodium 


 


Potassium 


 


Chloride 


 


Carbon Dioxide 


 


Anion Gap 


 


BUN 


 


Creatinine 


 


Est GFR ( Amer) 


 


Est GFR (Non-Af Amer) 


 


Glucose 


 


Calcium 


 


Total Bilirubin 


 


Direct Bilirubin 


 


Neonat Total Bilirubin 


 


Neonat Direct Bilirubin 


 


Neonat Indirect Bili 


 


AST 


 


ALT 


 


Alkaline Phosphatase 


 


Creatine Kinase 


 


Troponin I 


 


Total Protein 


 


Albumin 


 


Lipase 


 


Urine Color  YELLOW


 


Urine Appearance  SLIGHTLY-CLOUDY


 


Urine pH  5.0


 


Ur Specific Gravity  1.018


 


Urine Protein  NEGATIVE


 


Urine Glucose (UA)  NEGATIVE


 


Urine Ketones  NEGATIVE


 


Urine Blood  NEGATIVE


 


Urine Nitrite  NEGATIVE


 


Urine Bilirubin  NEGATIVE


 


Urine Urobilinogen  NEGATIVE


 


Ur Leukocyte Esterase  NEGATIVE


 


Urine WBC (Auto)  1


 


Urine RBC (Auto)  1


 


U Hyaline Cast (Auto)  1


 


Squamous Epi Cells Auto  9


 


Urine Mucus (Auto)  RARE


 


Urine Ascorbic Acid  NEGATIVE











                                        





Chest X-Ray  06/03/19 10:07


IMPRESSION:  NO SIGNIFICANT RADIOGRAPHIC FINDING IN THE CHEST.


 











                                        











Temp Pulse Resp BP Pulse Ox


 


 98.2 F   96   14   144/95 H  96 


 


 06/03/19 09:12  06/03/19 09:12  06/03/19 13:01  06/03/19 13:01  06/03/19 13:01








48-year-old female with hypertension on hydrochlorothiazide presents with 

multiple complaints including neck pain, left bicep pain and midsternal chest 

pain.  Patient states neck pain started 4 days prior to arrival.  She describes 

it as bilateral, aching.  Left arm pain started 3 days prior to arrival.  It is 

located in her left bicep described as an aching throbbing pain that is worse 

with extension.  Vital signs reviewed and within normal limits.  Patient's exam 

is significant for increased muscle tonicity along the trapezius bilaterally and

left bicep tenderness.  I do not believe this pain is cardiac in nature and 

believe this is likely overuse.











HEART Score:





History-0      


ECG-0      


Age-0      


Risk Factors-1   


Troponin-0   





Total: 1





 If HEART score is = 3 AND both troponin measurements are normal, the 30 day 

risk of a major adverse cardiac event (all-cause mortality, myocardial 

infarction or need for coronary revascularization) is < 1% (Sensitivity 100%, 

%).








Chest pain in a patient without evidence of cardiac or other serious etiology on

workup today. I discussed with patient that, based on their age, risk factors 

and emergency department testing today, the likelihood that their symptoms are 

related to a heart attack is very low (estimated risk of heart attack or death 

over the next 30 days of less than 1%).  The patient demonstrates decision 

making capacity and has verbalized an understanding of these risks to me. Based 

on this,  the patient has chosen to follow-up as an outpatient. Usual chest pain

return precautions reviewed. The patient states understanding and agreement with

this plan.





06/04/19 15:58








- Vital Signs


Vital signs: 


                                        











Temp Pulse Resp BP Pulse Ox


 


 98.2 F   96   14   144/95 H  96 


 


 06/03/19 09:12  06/03/19 09:12  06/03/19 13:01  06/03/19 13:01  06/03/19 13:01














- Laboratory


Result Diagrams: 


                                 06/03/19 10:22





                                 06/03/19 10:22


Laboratory results interpreted by me: 


                                        











  06/03/19 06/03/19





  10:22 10:22


 


WBC  3.9 L 


 


Plt Count  148 L 


 


Seg Neutrophils %  34.3 L 


 


Lymphocytes %  52.2 H 


 


Absolute Neutrophils  1.3 L 


 


Carbon Dioxide   31 H














- Diagnostic Test


Radiology reviewed: Image reviewed, Reports reviewed





- EKG Interpretation by Me


EKG shows normal: Sinus rhythm


Rate: Normal


Rhythm: NSR


When compared to previous EKG there are: Previous EKG unavailable





Discharge





- Discharge


Clinical Impression: 


Biceps strain


Qualifiers:


 Encounter type: initial encounter Laterality: left Qualified Code(s): S46.212A 

- Strain of muscle, fascia and tendon of other parts of biceps, left arm, 

initial encounter





Cervical strain


Qualifiers:


 Encounter type: initial encounter Qualified Code(s): S16.1XXA - Strain of 

muscle, fascia and tendon at neck level, initial encounter





Condition: Good


Disposition: HOME, SELF-CARE


Instructions:  High Blood Pressure ([facility]), Muscle Strain ([facility]), Neck Injury (Ce

rvical Strain) ([facility])


Additional Instructions: 


Follow up with your [id number] hours  for further care or return to the ED

IMMEDIATELY if symptoms worsen or you have any concerns.  If you cannot afford 

to follow up with your primary care physician a list of low cost clinics have 

been provided at the end of your discharge papers as well.











Most prescribed medications have multiple side effects.  The safest thing to do 

is when filling your prescription  speak to your pharmacist regarding possible 

interactions with your normal home medications and over the counter medications 

such as Ibuprofen, Tylenol, Benadryl.  If you experience any symptoms that cause

you discomfort or concern you should discontinue the medication immediately and 

return to the emergency room or call your primary care physician.


Prescriptions: 


Ibuprofen [Motrin 600 Mg Tablet] 600 mg PO TID #15 tablet


Referrals: 


SHAN ANDERSEN DO [Primary Care Provider] - Follow up as needed

## 2020-07-12 NOTE — EKG REPORT
SEVERITY:- ABNORMAL ECG -

SINUS RHYTHM

LEFT VENTRICULAR HYPERTROPHY

:

Confirmed by: John Nieves MD 12-Jul-2020 11:12:54

## 2020-07-12 NOTE — ER DOCUMENT REPORT
ED Cardiac





- General


Chief Complaint: Chest Pain


Stated Complaint: CHEST PAIN,BACK PAIN,HEADACHE


Time Seen by Provider: 07/12/20 10:40


Primary Care Provider: 


SHAN ANDERSEN DO [Primary Care Provider] - Follow up as needed


Notes: 





HPI: Patient is a 49-year-old female that states on Friday she was laying in bed

and felt like she was having a "panic attack".  She states she felt some 

tingling to the upper extremities and feet.  She states she was having some 

difficulty falling asleep but then fell asleep just fine.  She states on 

Saturday, yesterday she felt relatively well.  She states she did feel some 

"rumbling" in her stomach that was improved with food.  She denies any vomiting 

or diarrhea.  She states today she felt some pain to her left scapula worse with

movement.  No pain laying still.  She states it did radiate around to the left 

chest.  She denies any runny nose, congestion, or cough.  No calf pain or leg 

swelling.








ROS:  See HPI


All other review of systems reviewed and otherwise negative





Reviewed vital signs and nursing note as charted by RN.





PHYSICAL EXAM: 





CONSTITUTIONAL: Alert and oriented and responds appropriately to questions. 

Well-appearing; well-nourished





HEAD: Normocephalic; atraumatic





NECK: Supple without meningismus; non-tender; no cervical lymphadenopathy, no 

masses





CARD: Regular rate and rhythm; no murmurs; symmetric distal pulses





RESP: Normal chest excursion without splinting or tachypnea; breath sounds clear

and equal bilaterally; no wheezes, no rhonchi, no rales





ABD/GI: Normal bowel sounds; non-distended; soft, non-tender; no palpable 

organomegaly or masses





BACK: The back appears normal; patient has some mild left paraspinal muscular 

tenderness just medial to the left scapula with no swelling erythema





EXT: Normal ROM in all joints; non-tender to palpation; no edema





SKIN: No acute lesions noted





NEURO: CN 2-12 intact; 5/5 bilateral upper and lower extremity strength with 

sensation intact to light touch





PSYCH: The patient's mood and manner are appropriate. Grooming and personal 

hygiene are appropriate.


TRAVEL OUTSIDE OF THE [place] IN LAST 30 DAYS: No





- Related Data


Allergies/Adverse Reactions: 


                                        





acetaminophen [From Percocet] Adverse Reaction (Verified 06/03/19 09:10)


   


oxycodone [From Percocet] Adverse Reaction (Verified 06/03/19 09:10)


   








Home Medications: HCTZ





Past Medical History





- Social History


Smoking Status: Never Smoker


Drug Abuse: Marijuana


Family History: Hypertension





- Past Medical History


Cardiac Medical History: Reports: Hx Hypertension


Renal/ Medical History: Denies: Hx Peritoneal Dialysis


Past Surgical History: Reports: Hx Cholecystectomy, Hx Hysterectomy, Hx Tubal 

Ligation





- Immunizations


Hx Diphtheria, Pertussis, Tetanus Vaccination: Yes





Physical Exam





- Vital signs


Vitals: 


                                        











Temp Pulse Resp BP Pulse Ox


 


 97.9 F   88   20   159/113 H  100 


 


 07/12/20 09:29  07/12/20 09:29  07/12/20 09:29  07/12/20 09:29  07/12/20 09:29














Course





- Re-evaluation


Re-evalutation: 


EKG shows a heart rate of 87, normal sinus rhythm, LVH, no ST elevation or depr

ession.





07/12/20 10:59


Given the above history and physical examination, we will obtain a cardiac p

wenceslao, BNP, x-ray of the chest, and reassess.  Patient currently has no abdominal

discomfort.  Vital signs as recorded.  No hypoxia or tachycardia, currently 

pain-free.  Given the constellation of symptoms with the patient's history, I do

believe ACS, PE, dissection to be unlikely.  We will obtain a repeat 3-hour 

troponin and reassess.








07/12/20 14:16


Repeat troponin as recorded.  Vital signs are stable.  Patient has had some 

elevated blood pressure readings here with some normal/less elevated blood 

pressures as well.  Patient was on blood pressure medications and was taken off 

of the blood pressure medications by the primary care physician.  Patient 

currently has no headache, blurry vision, chest pain, lower extremity edema with

2- troponins.  I will hold on restarting the patient's blood pressure 

medications and have the patient follow-up with the primary care physician.  

Patient understands these instructions.  Strict return precautions have been 

explained.





- Vital Signs


Vital signs: 


                                        











Temp Pulse Resp BP Pulse Ox


 


 97.9 F   88   19   132/88 H  100 


 


 07/12/20 09:29  07/12/20 09:29  07/12/20 13:00  07/12/20 11:09  07/12/20 13:00














- Laboratory


Result Diagrams: 


                                 07/12/20 09:51





                                 07/12/20 09:51


Laboratory results interpreted by me: 


                                        











  07/12/20 07/12/20





  09:51 09:51


 


RDW  14.7 H 


 


AST   47 H


 


ALT   50 H


 


Total Protein   8.3 H














Discharge





- Discharge


Clinical Impression: 


 Pain of left scapula, Atypical chest pain





Condition: Good


Disposition: HOME, SELF-CARE


Additional Instructions: 


Come back immediately for any worsening pain, change in location or quality of 

pain, weakness or numbness, leg swelling, fever, shortness of breath, or any 

other acute problems.  Please follow-up with your primary care physician for 

reassessment and for further management of your blood pressure.  If you would 

like you can start taking over-the-counter Prilosec once daily.  This will help 

with reflux symptoms after taking it for around 1 week.


Referrals: 


SHAN ANDERSEN,  [Primary Care Provider] - Follow up as needed

## 2020-07-12 NOTE — RADIOLOGY REPORT (SQ)
EXAM DESCRIPTION:  CHEST 2 VIEWS



IMAGES COMPLETED DATE/TIME:  7/12/2020 10:30 am



REASON FOR STUDY:  Chest Pain



COMPARISON:  6/3/2019



TECHNIQUE:  Frontal and lateral radiographic views of the chest acquired.



NUMBER OF VIEWS:  Two view.



LIMITATIONS:  None.



FINDINGS:  LUNGS AND PLEURA: No pneumothorax. No consolidation or pleural effusion.

MEDIASTINUM AND HILAR STRUCTURES: Stable.

HEART AND VASCULAR STRUCTURES: Stable.

BONES: No acute findings.

HARDWARE: None in the chest.

OTHER: No other significant finding.



IMPRESSION:  NO ACUTE FINDINGS.



TECHNICAL DOCUMENTATION:  JOB ID:  [id number]

[id number]

 2011 [employer]- All Rights Reserved



Reading location - IP/workstation name: [email or web address]

## 2020-08-24 NOTE — ER RDC ASSESSMENT REPORT
Intake





- In the Last 14 days


Have you traveled outside [place]?: No


Have you been in close contact with someone CONFIRMED: Yes


Worked in Healthcare?: No





- Symptoms


Subjective Fever(Felt feverish): No


Chills: No


Muscule Aches: No


Runny Nose: No


Sore Throat: No


Cough (New or worsening chronic cough): No


Shortness of breath: No


Nausea or Vomiting: No


Headache: No


Abdominal Pain: No


Diarrhea(3 or more loose stools in last 24 hours): No





- Do you have any of the following


Chronic lung disease: Asthma or emphysema or COPD: No


Cystic Fibrosis: No


Diabetes: No


High Blood Pressure: Yes


Cardiovascular Disease: No


Chronic Kidney Disease: No


Chronic Liver Disease: No


Chronic blood disorder like Sickle Cell Disease: No


Weak immune system due to disease or medication: No


Neurologic condition that limits movement: No


Developmental delay - Moderate to Severe: No


Recent (within past 2 weeks) or current Pregnancy: No


Morbid Obesity (>100 pounds over ideal weight): No





- Objective


Temperature: 98.5 F


Pulse Rate: 88


Respiratory Rate: 18


Blood Pressure: 151/84


O2 Sat by Pulse Oximetry: 98


Objective: 


Given above, testing performed: 


covid


Disposition: Home; Selfcare





General





- General


Stated Complaint: asymptomatic, covid screen


Time Seen by Provider: 08/24/20 10:26


Mode of Arrival: Ambulatory


Information source: Patient





- HPI


Notes: 





Patient presents to clinic for COVID-19 testing after coming in close contact 

with another COVID 19 positive individual. Patient is asymptomatic.  They deny 

any cough, shortness of breath, fever, chills, muscle aches, rhinorrhea, sore 

throat, nausea or vomiting, headache, abdominal pain or diarrhea.  Patient has 

no acute medical concerns.





- Related Data


Allergies/Adverse Reactions: 


                                        





acetaminophen [From Percocet] Adverse Reaction (Verified 06/03/19 09:10)


   


oxycodone [From Percocet] Adverse Reaction (Verified 06/03/19 09:10)


   











Past Medical History





- General


Information source: Patient





- Social History


Smoking Status: Current Every Day Smoker


Cigarette use (# per day): Yes - 2


Smoking Education Provided: Yes


Family History: Hypertension





- Past Medical History


Cardiac Medical History: Reports: Hx Hypertension


Pulmonary Medical History: Reports: None


EENT Medical History: Reports: None


Neurological Medical History: Reports: None


Endocrine Medical History: Reports: None


Renal/ Medical History: Reports: None.  Denies: Hx Peritoneal Dialysis


Malignancy Medical History: Reports: None


GI Medical History: Reports: None


Musculoskeletal Medical History: Reports None


Skin Medical History: Reports None


Psychiatric Medical History: Reports: None


Traumatic Medical History: Reports: None


Infectious Medical History: Reports: None


Past Surgical History: Reports: Hx Cholecystectomy, Hx Hysterectomy, Hx Tubal 

Ligation





Physical Exam





- General


General appearance: Appears well, Alert


In distress: None


Notes: 





PHYSICAL EXAMINATION: 


GENERAL: Well-appearing and in no acute distress. 


HEAD: Atraumatic, normocephalic. 


EYES: sclera anicteric, conjunctiva are normal. 


ENT: nares patent. Moist mucous membranes. 


NECK: Normal range of motion, supple without lymphadenopathy. 


LUNGS: No increased work of breathing. Lung sounds CTAB and equal. No wheezes 

rales or rhonchi. 


HEART: Regular rate and rhythm without murmurs.


ABDOMEN: Soft, nontender, normal bowel sounds, no guarding. 


EXTREMITIES: Normal range of motion, no pitting edema. No cyanosis. 


NEUROLOGICAL: A&O x 3. Normal speech. 


PSYCH: Normal mood, normal affect. 


SKIN: Warm, Dry, normal turgor, no rashes or lesions noted








Patient Education/Counseling


Counseling/Education: 





Patient presents for COVID 19 testing after close exposure to another person who

has tested positive for COVID 19.  Patient is asymptomatic at this time. Patient

does not have emergency worrying symptoms such as difficulty breathing, 

shortness of breath, chest pain, pressure, confusion or cyanosis.  Patient 

appears suitable for discharge as vital signs are stable and patient is nontoxic

in appearance.  Good return precautions have been discussed with patient, 

patient verbalized understanding and is agreeable with discharge plan of care at

this time.


Guidance for worsening S/SX: 





As a person under investigation for Covid 19, the [place] department of 

Health and Human Services, division of public health advises you to adhere to 

the following guidance until your test results are reported to you.  If your 

test result is positive, you will receive additional information from your 

provider and your local health department at that time.


 


Remain at home until you are cleared by the health provider or public health 

authorities.


 


Keep a log of visitors to your home, notify any visitors to your home of your 

isolation status.


 


If you plan to move to a new address or leave the county, notify the local 

health department in your County.


 


Call your doctor or seek care if you have an urgent medical need.  Before 

seeking medical care, call ahead to get instructions from the provider before 

arriving at the medical office clinic or hospital.  Notify them that you are 

being tested for the virus that causes Covid 19 so that arrangements can be 

made, as necessary, to prevent transmission to others in the healthcare setting.

 Next, notify the local health department in your county.


 


If a medical emergency arises and you need to call 911, inform the first 

responders that you are being tested for the virus that causes Covid 19.  Next, 

notify the local health department in your Critical access hospital.





RDC Discharge





- Discharge


Clinical Impression: 


 Encounter for screening laboratory testing for COVID-19 virus in asymptomatic 

patient





Condition: Good


Disposition: Home; Selfcare

## 2021-01-09 NOTE — ER DOCUMENT REPORT
Entered by YARIEL MORTON SCRIBE  01/09/21 [id number] 





Acting as scribe for:ELIU GIORDANO MD





ED General





- General


Chief Complaint: Chest Pain


Stated Complaint: CHEST PAIN,RIGHT ARM PAIN


Time Seen by Provider: 01/09/21 12:06


Primary Care Provider: 


SHAN ANDERSEN DO [Primary Care Provider] - Follow up as needed


Information source: Patient


Notes: 





This 50 year old female patient with history of HTN, presents to the ED today 

with complaints of chest pain and intermittent "non-painful tingling sensations"

traveling down her bilateral lower extremities and right arm that began this 

morning 10 hours pta. Patient states she has had similar episodes in the past 

due to stress and anxiety. Patient states she is visiting the ED due to history 

of smoking, HTN, and family history of CAD and MI (family members were in their 

70's). Denies neck pain, trouble with bowel movement, history of DM, HLD, stress

tests, or MI. Patient states she has recently visited GI for her upper abdominal

pain and has a endoscopy scheduled in x4 days.


TRAVEL OUTSIDE OF THE [place] IN LAST 30 DAYS: No





- Related Data


Allergies/Adverse Reactions: 


                                        





acetaminophen [From Percocet] Adverse Reaction (Verified 06/03/19 09:10)


   


oxycodone [From Percocet] Adverse Reaction (Verified 06/03/19 09:10)


   








Home Medications: amlodipine 5 mg





Past Medical History





- General


Information source: Patient, [facility] Records





- Social History


Smoking Status: Current Every Day Smoker


Cigarette use (# per day): Yes


Chew tobacco use (# tins/day): No


Frequency of alcohol use: Occasional


Drug Abuse: None


Family History: CAD, Hypertension, Other - MI


Patient has homicidal ideation: No





- Past Medical History


Cardiac Medical History: Reports: Hx Hypertension


   Denies: Hx Heart Attack, Hx Hypercholesterolemia


Endocrine Medical History: Denies: Hx Diabetes Mellitus Type 1, Hx Diabetes 

Mellitus Type 2


Past Surgical History: Reports: Hx Cholecystectomy, Hx Hysterectomy, Hx Tubal 

Ligation





- Immunizations


Hx Diphtheria, Pertussis, Tetanus Vaccination: Yes





Review of Systems





- Review of Systems


Constitutional: No symptoms reported


EENT: No symptoms reported


Cardiovascular: See HPI, Chest pain


Respiratory: No symptoms reported


Gastrointestinal: See HPI, Last bowel movement - normal


Genitourinary: No symptoms reported


Female Genitourinary: No symptoms reported


Musculoskeletal: See HPI.  denies: Neck pain


Skin: No symptoms reported


Hematologic/Lymphatic: No symptoms reported


Neurological/Psychological: See HPI, Tingling - down bilateral lower extremities

and right arm


-: Yes All other systems reviewed and negative





Physical Exam





- Vital signs


Vitals: 


                                        











Temp Pulse Resp BP Pulse Ox


 


 98.5 F   94   20   119/86 H  99 


 


 01/09/21 12:04  01/09/21 12:04  01/09/21 12:04  01/09/21 12:04  01/09/21 12:04














- General


General appearance: Appears well, Alert





- HEENT


Head: Normocephalic, Atraumatic


Eyes: Normal


Pupils: PERRL





- Respiratory


Respiratory status: No respiratory distress


Chest status: Nontender


Breath sounds: Normal


Chest palpation: Normal





- Cardiovascular


Rhythm: Regular


Heart sounds: Normal auscultation, S1 appreciated, S2 appreciated


Murmur: No





- Abdominal


Inspection: Obese


Distension: No distension


Bowel sounds: Normal


Tenderness: Nontender





- Back


Back: Normal, Nontender, Other - No midline tenderness





- Extremities


General upper extremity: Normal inspection, Normal ROM


General lower extremity: Normal inspection, Nontender, Normal ROM.  No: Edema





- Neurological


Neuro grossly intact: Yes


Cognition: Normal


Orientation: AAOx4


[place] Coma Scale Eye Opening: Spontaneous


[place] Coma Scale Verbal: Oriented


[place] Coma Scale Motor: Obeys Commands


[place]rina Coma Scale Total: 15


Speech: Normal


Motor strength normal: LUE, RUE, LLE, RLE


Sensory: Normal





- Psychological


Associated symptoms: Normal affect, Normal mood





- Skin


Skin Temperature: Warm


Skin Moisture: Dry


Skin Color: Normal





Course





- Re-evaluation


Re-evalutation: 





01/09/21 17:15


Patient is tearful effective and admits to a lot of stress in her life.  Patient

has a negative work-up for pulmonary embolism, MI ,or any DVT





- Vital Signs


Vital signs: 


                                        











Temp Pulse Resp BP Pulse Ox


 


 98.5 F   94   23 H  107/78   97 


 


 01/09/21 12:04  01/09/21 12:04  01/09/21 14:01  01/09/21 14:01  01/09/21 14:01











01/09/21 17:15


Vital signs stable


01/09/21 17:16


Patient has hyperventilation respiratory rate 23





- Laboratory Results


Result Diagrams: 


                                 01/09/21 12:31





                                 01/09/21 12:31


Laboratory Results Interpreted: 


                                        











  01/09/21 01/09/21 01/09/21





  12:31 12:31 12:31


 


RDW  14.5 H  


 


Lymph % (Auto)  47.4 H  


 


Seg Neutrophils %  39.6 L  


 


D-Dimer    0.68 H


 


AST   76 H 


 


ALT   90 H 


 


Total Protein   8.6 H 











01/09/21 17:16


Laboratories essentially within normal limits D-dimer 0.68 no critical lab 

values at this time.


Critical Laboratory Results Reviewed: No Critical Results





- Radiology Results


Radiology Results Interpreted: 





01/09/21 17:16





                                        





Chest X-Ray  01/09/21 12:10


IMPRESSION:  NO ACUTE RADIOGRAPHIC FINDING IN THE CHEST.


 








Venous Doppler Study  01/09/21 13:50


IMPRESSION:  NO EVIDENCE DVT OR SVT IN EITHER LEG.


 








Chest/Abdomen CTA  01/09/21 14:48


IMPRESSION:  No acute cardiopulmonary disease.  No pulmonary embolism.


 








Chest x-ray shows no acute radiographic findings of cardiopulmonary disease.  

Venous Doppler study of both lower extremities showed no evidence of DVT or SVT 

in either leg.





Chest abdomen CTA no acute cardiopulmonary disease no pulmonary embolism.


Critical Radiology Results Reviewed: No Critical Results





- EKG Interpretation by Me


Additional EKG results interpreted by me: 





01/09/21 17:20


Twelve-lead EKG shows sinus tachycardia rate of 103 borderline LAD left axis 

deviation.  No STEMI.  Normal intervals for ID interval QRS and QT.





Discharge





- Discharge


Clinical Impression: 


 Chest pain, Epigastric pain, Anxiety in acute stress reaction, Bilateral leg 

pain





Condition: Stable


Disposition: HOME, SELF-CARE


Additional Instructions: 


Chest Pain of Unclear Cause





   The exact cause of your chest pain isn't clear. Fortunately, there is no 

evidence of a dangerous medical condition.  Further testing may be required to 

find the source of the pain.


   Most often, we find that this pain is coming from the chest wall -- the 

muscles or rib joints in the chest.  But chest pain can come from the lung and 

lung lining, the esophagus, the heart valves or heart lining, and even the 

stomach or gallbladder.


   Rest.  Eat lightly until the pain is gone.  We may prescribe medicine for 

pain and inflammation.


   You should call the physician immediately if the pain radiates to the 

shoulder, jaw or arms; if you start to run a fever or develop a cough; or if you

develop shortness of breath, or other new or alarming symptoms.





Keep your appointment that you have with your gastroenterologist regarding upper

endoscopy this week coming up and follow-up with your primary care physician 

regarding the multiple complaints that you have shared with us today we have not

found any life-threatening illness as well as no evidence for heart attack, no 

evidence for a pulmonary embolism, no evidence for DVT in lower extremities.  We

strongly advised that you discontinue smoking tobacco.


Referrals: 


SHAN ANDERSEN, DO [Primary Care Provider] - Follow up as needed





I personally performed the services described in the documentation, reviewed and

edited the documentation which was dictated to the scribe in my presence, and it

accurately records my words and actions.

## 2021-01-09 NOTE — RADIOLOGY REPORT (SQ)
EXAM DESCRIPTION:  VENOUS BILATERAL LOWER



IMAGES COMPLETED DATE/TIME:  1/9/2021 3:32 pm



REASON FOR STUDY:  ACHES IN BOTH LEGS.CHEST PAIN/SMOKER



COMPARISON:  None.



TECHNIQUE:  Dynamic and static gray scale and color images acquired of both lower extremity venous sy
stems. Selected spectral images acquired with additional compression and augmentation maneuvers. Imag
es stored on PACS.



LIMITATIONS:  None.



FINDINGS:  RIGHT LEG

COMMON FEMORAL AND FEMORAL: Normal phasicity, compression and augmentation. No visualized echogenic m
aterial on gray scale. No defects on color images.

POPLITEAL: Normal compression and augmentation. No visualized echogenic material on gray scale. No de
fects on color images.

CALF VESSELS: Normal compression and augmentation. No visualized echogenic material on gray scale. No
 defects on color image.

GSV AND SSV: Normal compression. No visualized echogenic material on gray scale. No defects on color 
images.

ANY DEEP VENOUS INSUFFICIENCY: Not evaluated.

ANY EVIDENCE OF POPLITEAL CYST: No.

OTHER: No other significant finding.

LEFT LEG

COMMON FEMORAL AND FEMORAL: Normal phasicity, compression and augmentation. No visualized echogenic m
aterial on gray scale. No defects on color images.

POPLITEAL: Normal compression and augmentation. No visualized echogenic material on gray scale. No de
fects on color images.

CALF VESSELS: Normal compression and augmentation. No visualized echogenic material on gray scale. No
 defects on color images.

GSV AND SSV: Normal compression. No visualized echogenic material on gray scale. No defects on color 
images.

ANY DEEP VENOUS INSUFFICIENCY: Not evaluated.

ANY EVIDENCE POPLITEAL CYST: No.

OTHER: No other significant finding.



IMPRESSION:  NO EVIDENCE DVT OR SVT IN EITHER LEG.



TECHNICAL DOCUMENTATION:  JOB ID:  [id number]

 [employer]- All Rights Reserved



Reading location - IP/workstation name: [email or web address]

## 2021-01-09 NOTE — RADIOLOGY REPORT (SQ)
EXAM DESCRIPTION:  CHEST 2 VIEWS



IMAGES COMPLETED DATE/TIME:  1/9/2021 12:06 pm



REASON FOR STUDY:  chest pain



COMPARISON:  None.



EXAM PARAMETERS:  NUMBER OF VIEWS: two views

TECHNIQUE: Digital Frontal and Lateral radiographic views of the chest acquired.

RADIATION DOSE: NA

LIMITATIONS: none



FINDINGS:  LUNGS AND PLEURA: No opacities, masses or pneumothorax. No pleural effusion.

MEDIASTINUM AND HILAR STRUCTURES: No masses or contour abnormalities.

HEART AND VASCULAR STRUCTURES: Heart normal size.  No evidence for failure.

BONES: No acute findings.

HARDWARE: None in the chest.

OTHER: No other significant finding.



IMPRESSION:  NO ACUTE RADIOGRAPHIC FINDING IN THE CHEST.



TECHNICAL DOCUMENTATION:  JOB ID:  [id number]

 2011 [employer]- All Rights Reserved



Reading location - IP/workstation name: [email or web address]

## 2021-01-09 NOTE — EKG REPORT
SEVERITY:- OTHERWISE NORMAL ECG -

SINUS TACHYCARDIA

BORDERLINE LEFT AXIS DEVIATION

:

Confirmed by: Ariana Moreland 09-Jan-2021 23:14:08

## 2021-01-09 NOTE — ER DOCUMENT REPORT
ED Medical Screen (RME)





- General


Chief Complaint: Chest Pain


Stated Complaint: CHEST PAIN,RIGHT ARM PAIN


Time Seen by Provider: 01/09/21 12:06


Primary Care Provider: 


SHAN ANDERSEN DO [Primary Care Provider] - Follow up as needed


Notes: 





HPI: 50-year-old female with history of hypertension obesity, family history of 

CAD presenting for heaviness in the anterior chest with some radiation of 

discomfort and tingling into the right shoulder right arm while walking at the 

store today.  She was short of breath with this.  States it continues currently.

 No history of anything similar.  States intermittently through the week she has

had some discomfort through the chest into the right shoulder region which she 

describes as a burning sensation.  Patient is currently following with 

gastroenterology for abdominal discomfort and is due for endoscopy on Wednesday.

 No prior history of cardiac evaluation or stress test





PHYSICAL EXAMINATION: EKG sinus tachycardia without other ectopy.  Lung sounds 

are clear to auscultation, mildly tachycardic rhythm.  Patient is mildly anxious











I have greeted and performed a rapid initial assessment of this patient.  A 

comprehensive ED assessment and evaluation of the patient, analysis of test 

results and completion of medical decision making process will be conducted by 

an additional ED providers.  Please note that clinical decision making for this 

patient was made during the 2020 pandemic of novel coronavirus which caused a 

significant strain on the healthcare system including at this particular 

facility.  Criteria for admission discharge and level of care decisions as well 

as treatment decisions have necessarily changed


TRAVEL OUTSIDE OF THE [place] IN LAST 30 DAYS: No





- Related Data


Allergies/Adverse Reactions: 


                                        





acetaminophen [From Percocet] Adverse Reaction (Verified 06/03/19 09:10)


   


oxycodone [From Percocet] Adverse Reaction (Verified 06/03/19 09:10)


   











Past Medical History





- Past Medical History


Cardiac Medical History: Reports: Hx Hypertension


Renal/ Medical History: Denies: Hx Peritoneal Dialysis


Past Surgical History: Reports: Hx Cholecystectomy, Hx Hysterectomy, Hx Tubal 

Ligation





- Immunizations


Hx Diphtheria, Pertussis, Tetanus Vaccination: Yes





Physical Exam





- Vital signs


Vitals: 





                                        











Temp Pulse Resp BP Pulse Ox


 


 98.5 F   94   20   119/86 H  99 


 


 01/09/21 12:04  01/09/21 12:04  01/09/21 12:04  01/09/21 12:04  01/09/21 12:04














Course





- Vital Signs


Vital signs: 





                                        











Temp Pulse Resp BP Pulse Ox


 


 98.5 F   94   20   119/86 H  99 


 


 01/09/21 12:04  01/09/21 12:04  01/09/21 12:04  01/09/21 12:04  01/09/21 12:04














Doctor's Discharge





- Discharge


Referrals: 


SHAN ANDERSEN DO [Primary Care Provider] - Follow up as needed

## 2021-01-09 NOTE — RADIOLOGY REPORT (SQ)
EXAM DESCRIPTION:  CTA CHEST



IMAGES COMPLETED DATE/TIME:  1/9/2021 3:12 pm



REASON FOR STUDY:  CHEST PAIN/ELEVATED D DIMER



COMPARISON:  Chest radiograph same date.



TECHNIQUE:  CT scan of the chest performed using helical scanning technique with dynamic intravenous 
contrast injection.  Images reviewed with lung, soft tissue and bone windows.  Reconstructed coronal 
and sagittal MPR images reviewed.

Additional 3 dimensional post-processing performed to develop Maximal Intensity Projection images (MI
P).  All images stored on PACS.

All CT scanners at this facility use dose modulation, iterative reconstruction, and/or weight based d
osing when appropriate to reduce radiation dose to as low as reasonably achievable (ALARA).

[facility]: Dose Right  [facility]: CareDose    [facility]: Dose Right    [facility]: Teradose 4D    [facility]: Smart Technologies



CONTRAST TYPE AND DOSE:  contrast/concentration: Isovue 350.00 mmol/ml; Total Contrast Delivered: 75.
0 ml; Total Saline Delivered: 45.0 ml

Contrast bolus optimized for the pulmonary arteries. Not diagnostic for the aorta.



RENAL FUNCTION:  GFR > 60.



RADIATION DOSE:  CT Rad equipment meets quality standard of care and radiation dose reduction techniq
ues were employed. CTDIvol: 5.0 - 20.4 mGy. DLP: 700 mGy-cm. .



LIMITATIONS:  None.



FINDINGS:  LUNGS AND PLEURA: No masses, infiltrates, or pneumothorax.  No pleural effusions or pleura
l calcifications.

AORTA AND GREAT VESSELS: No aneurysm.  Contrast bolus not optimized for the aorta.

HEART: No pericardial effusion. No significant coronary artery calcifications.

PULMONARY ARTERIES: No emboli visualized in the main pulmonary arteries or the segmental branches.

HILAR AND MEDIASTINAL STRUCTURES: No identified masses or abnormal nodes.

HARDWARE: None in the chest.

UPPER ABDOMEN: No significant findings.  Limited exam.

THYROID AND OTHER SOFT TISSUES: No masses.  No adenopathy.

BONES: No acute or significant finding.

3D MIPS: Confirm above findings.

OTHER: No other significant finding.



IMPRESSION:  No acute cardiopulmonary disease.  No pulmonary embolism.



COMMENT:  Quality ID # [id number]: Final reports with documentation of one or more dose reduction techniques
 (e.g., Automated exposure control, adjustment of the mA and/or kV according to patient size, use of 
iterative reconstruction technique)



TECHNICAL DOCUMENTATION:  JOB ID:  [id number]

 2011 [employer]- All Rights Reserved



Reading location - IP/workstation name: [email or web address]

## 2021-01-13 NOTE — OPERATIVE REPORT
Operative Report


DATE OF SURGERY: 01/13/21


Operative Report: 





The risks benefits and alternatives of the procedure explained to the patient in

detail and informed consent is obtained.A  GIF Olympus video scope was inserted 

into the patient's mouth and hypopharynx ,the esophagus is identified intubated 

and insufflated ,the scope was then advanced through the esophagus stomach and 

duodenum ,retroflexion maneuver is done, the esophagus stomach and first and 

second portions of the duodenum examined


PREOPERATIVE DIAGNOSIS: Abdominal bloating, gastroesophageal reflux disease and 

dyspepsia


POSTOPERATIVE DIAGNOSIS: Gastritis status post biopsy rule out Helicobacter 

pylori


OPERATION: EGD with biopsy


SURGEON: LORENZO WALKER


ANESTHESIA: LMAC


TISSUE REMOVED OR ALTERED: As noted above.


COMPLICATIONS: 





None.


ESTIMATED BLOOD LOSS: None.


INTRAOPERATIVE FINDINGS: As noted above.


PROCEDURE: 





Patient tolerated procedure well.


No immediate postprocedure complications are noted.


Patient is discharged in good condition.


Discharge date 1/13/2020.  Discharge diet: Regular.  Discharge activity: 

Regular.








2 to 3-week follow-up to discuss findings.


Patient is instructed call the office or proceed to the emergency room should 

there be any further problems questions.


Wait on the pathology.